# Patient Record
Sex: MALE | Race: WHITE
[De-identification: names, ages, dates, MRNs, and addresses within clinical notes are randomized per-mention and may not be internally consistent; named-entity substitution may affect disease eponyms.]

---

## 2021-06-02 ENCOUNTER — HOSPITAL ENCOUNTER (EMERGENCY)
Dept: HOSPITAL 75 - ER FS | Age: 81
Discharge: ANOTHER HOSPITAL | End: 2021-06-02
Payer: COMMERCIAL

## 2021-06-02 VITALS — HEIGHT: 61.81 IN | WEIGHT: 99.21 LBS | BODY MASS INDEX: 18.26 KG/M2

## 2021-06-02 VITALS — SYSTOLIC BLOOD PRESSURE: 142 MMHG | DIASTOLIC BLOOD PRESSURE: 60 MMHG

## 2021-06-02 DIAGNOSIS — I63.9: Primary | ICD-10-CM

## 2021-06-02 LAB
ALBUMIN SERPL-MCNC: 4.3 GM/DL (ref 3.2–4.5)
ALP SERPL-CCNC: 111 U/L (ref 40–136)
ALT SERPL-CCNC: 11 U/L (ref 0–55)
APTT BLD: 28 SEC (ref 24–35)
APTT PPP: YELLOW S
BACTERIA #/AREA URNS HPF: NEGATIVE /HPF
BASOPHILS # BLD AUTO: 0.1 10^3/UL (ref 0–0.1)
BASOPHILS NFR BLD AUTO: 1 % (ref 0–10)
BILIRUB SERPL-MCNC: 0.9 MG/DL (ref 0.1–1)
BILIRUB UR QL STRIP: NEGATIVE
BUN/CREAT SERPL: 7
CALCIUM SERPL-MCNC: 9.5 MG/DL (ref 8.5–10.1)
CHLORIDE SERPL-SCNC: 101 MMOL/L (ref 98–107)
CO2 SERPL-SCNC: 27 MMOL/L (ref 21–32)
CREAT SERPL-MCNC: 1.2 MG/DL (ref 0.6–1.3)
EOSINOPHIL # BLD AUTO: 0.1 10^3/UL (ref 0–0.3)
EOSINOPHIL NFR BLD AUTO: 1 % (ref 0–10)
FIBRINOGEN PPP-MCNC: CLEAR MG/DL
GFR SERPLBLD BASED ON 1.73 SQ M-ARVRAT: 59 ML/MIN
GLUCOSE SERPL-MCNC: 127 MG/DL (ref 70–105)
GLUCOSE UR STRIP-MCNC: NEGATIVE MG/DL
HCT VFR BLD CALC: 44 % (ref 40–54)
HGB BLD-MCNC: 15 G/DL (ref 13.3–17.7)
INR PPP: 0.9 (ref 0.8–1.4)
KETONES UR QL STRIP: NEGATIVE
LEUKOCYTE ESTERASE UR QL STRIP: NEGATIVE
LYMPHOCYTES # BLD AUTO: 2.3 X 10^3 (ref 1–4)
LYMPHOCYTES NFR BLD AUTO: 29 % (ref 12–44)
MANUAL DIFFERENTIAL PERFORMED BLD QL: NO
MCH RBC QN AUTO: 30 PG (ref 25–34)
MCHC RBC AUTO-ENTMCNC: 34 G/DL (ref 32–36)
MCV RBC AUTO: 90 FL (ref 80–99)
MONOCYTES # BLD AUTO: 0.6 X 10^3 (ref 0–1)
MONOCYTES NFR BLD AUTO: 7 % (ref 0–12)
NEUTROPHILS # BLD AUTO: 4.9 X 10^3 (ref 1.8–7.8)
NEUTROPHILS NFR BLD AUTO: 62 % (ref 42–75)
NITRITE UR QL STRIP: NEGATIVE
PH UR STRIP: 7.5 [PH] (ref 5–9)
PLATELET # BLD: 332 10^3/UL (ref 130–400)
PMV BLD AUTO: 8.9 FL (ref 7.4–10.4)
POTASSIUM SERPL-SCNC: 5 MMOL/L (ref 3.6–5)
PROT SERPL-MCNC: 7.4 GM/DL (ref 6.4–8.2)
PROT UR QL STRIP: NEGATIVE
PROTHROMBIN TIME: 12.9 SEC (ref 12.2–14.7)
RBC #/AREA URNS HPF: (no result) /HPF
SODIUM SERPL-SCNC: 137 MMOL/L (ref 135–145)
SP GR UR STRIP: 1.01 (ref 1.02–1.02)
WBC # BLD AUTO: 7.8 10^3/UL (ref 4.3–11)
WBC #/AREA URNS HPF: (no result) /HPF

## 2021-06-02 PROCEDURE — 80053 COMPREHEN METABOLIC PANEL: CPT

## 2021-06-02 PROCEDURE — 93041 RHYTHM ECG TRACING: CPT

## 2021-06-02 PROCEDURE — 81000 URINALYSIS NONAUTO W/SCOPE: CPT

## 2021-06-02 PROCEDURE — 36415 COLL VENOUS BLD VENIPUNCTURE: CPT

## 2021-06-02 PROCEDURE — 85730 THROMBOPLASTIN TIME PARTIAL: CPT

## 2021-06-02 PROCEDURE — 82947 ASSAY GLUCOSE BLOOD QUANT: CPT

## 2021-06-02 PROCEDURE — 93005 ELECTROCARDIOGRAM TRACING: CPT

## 2021-06-02 PROCEDURE — 71045 X-RAY EXAM CHEST 1 VIEW: CPT

## 2021-06-02 PROCEDURE — 85025 COMPLETE CBC W/AUTO DIFF WBC: CPT

## 2021-06-02 PROCEDURE — 70450 CT HEAD/BRAIN W/O DYE: CPT

## 2021-06-02 PROCEDURE — 85610 PROTHROMBIN TIME: CPT

## 2021-06-02 PROCEDURE — 84484 ASSAY OF TROPONIN QUANT: CPT

## 2021-06-02 NOTE — ED NEUROLOGICAL PROBLEM
General


Stated Complaint:  SLURRED SPEECH; AMS





History of Present Illness


Date Seen by Provider:  Jun 2, 2021


Time Seen by Provider:  09:48


Initial Comments


80-year-old male presents with slurred speech.  Patient reports that he noticed 

around 4:00 this morning when he woke up his speech was slurred.  Patient has 

right-sided facial droop, patient denies any previous stroke.  Patient has no 

extremity symptoms.  He denies any headache, fevers or chills.  He does have 

recent tick bites.  Patient reports he sees a physician at [facility] for his 

heart.  He is on aspirin and metoprolol.  Patient is very poor historian and 

unable to give much other HPI.  Patient lives alone.  No other systemic 

complaints





Allergies and Home Medications


Allergies


Coded Allergies:  


     No Known Drug Allergies (Unverified , 6/2/21)





Patient Home Medication List


Home Medication List Reviewed:  Yes





Review of Systems


Review of Systems


Constitutional:  see HPI; No chills, No fever


Eyes:  No Symptoms Reported


Ears, Nose, Mouth, Throat:  see HPI


Respiratory:  No cough, No short of breath


Cardiovascular:  No chest pain, No palpitations


Gastrointestinal:  No abdominal pain, No nausea, No vomiting


Genitourinary:  no symptoms reported


Musculoskeletal:  no symptoms reported


Psychiatric/Neurological:  No Symptoms Reported


Endocrine:  No Symptoms Reported





Past Medical-Social-Family Hx


Past Med/Social Hx:  Reviewed Nursing Past Med/Soc Hx





Physical Exam


Vital Signs





Vital Signs - First Documented








 6/2/21





 09:54


 


Temp 36.4


 


Pulse 71


 


Resp 18


 


B/P (MAP) 174/88 (116)


 


Pulse Ox 98


 


O2 Delivery Room Air





Capillary Refill :


Height, Weight, BMI


Height: '"


Weight: lbs. oz. kg;  BMI


Method:


General Appearance:  no apparent distress


HEENT:  PERRL/EOMI, pharynx normal


Neck:  full range of motion, supple


Respiratory:  lungs clear, normal breath sounds


Cardiovascular:  normal peripheral pulses, regular rate, rhythm


Gastrointestinal:  non tender, soft


Back:  no CVA tenderness


Extremities:  normal range of motion, non-tender


Neurologic/Psychiatric:  alert, normal mood/affect, oriented x 3


Crainal Nerves:  abnormal speech (Mild slurred), facial droop


Coordination/Gait:  normal finger to nose, normal gait


Motor/Sensory:  no motor deficit; No weak motor strength RUE, No weak motor 

strength LUE, No weak motor strength RLE, No weak motor strength LLE


Skin:  normal color, warm/dry





Stroke


NIH Stroke Scale Assessment





   Level of Consciousness: 0=Alert (0), Level of Consciousness-Questions: 

   0=Answers both month/age (0), LOC Commands: 0=Performs both tasks (0), Gaze: 

   Normal (0), Visual Fields: 0=No visual loss (0), Facial Movement (Facial 

   Paresis): 1=Minor paralysis (1), Motor Function-Arms Right: 0=No drift (0), 

   Motor Function-Arms Left: 0=No drift (0), Motor Function-Legs Right: 0=No 

   drift (0), Motor Function-Legs Left: 0=No drift (0), Limb Ataxia: 0=Absent 

   (0), Sensory: 0=Normal:no loss (0), Best Language: 0=No aphasia (0), 

   Dysarthria: 1=Mild to moderate loss (1), Extinction & Inattention: 0=No 

   abnormality (0), Total: 2





Progress/Results/Core Measures


Results/Orders


Lab Results





Laboratory Tests








Test


 6/2/21


09:54 6/2/21


09:56 6/2/21


10:59 Range/Units


 


 


Glucometer 117 H     MG/DL


 


White Blood Count


 


 7.8 


 


 4.3-11.0


10^3/uL


 


Red Blood Count


 


 4.95 


 


 4.35-5.85


10^6/uL


 


Hemoglobin  15.0   13.3-17.7  G/DL


 


Hematocrit  44   40-54  %


 


Mean Corpuscular Volume  90   80-99  FL


 


Mean Corpuscular Hemoglobin  30   25-34  PG


 


Mean Corpuscular Hemoglobin


Concent 


 34 


 


 32-36  G/DL





 


Red Cell Distribution Width  13.2   10.0-14.5  %


 


Platelet Count


 


 332 


 


 130-400


10^3/uL


 


Mean Platelet Volume  8.9   7.4-10.4  FL


 


Immature Granulocyte % (Auto)  0    %


 


Neutrophils (%) (Auto)  62   42-75  %


 


Lymphocytes (%) (Auto)  29   12-44  %


 


Monocytes (%) (Auto)  7   0-12  %


 


Eosinophils (%) (Auto)  1   0-10  %


 


Basophils (%) (Auto)  1   0-10  %


 


Neutrophils # (Auto)  4.9   1.8-7.8  X 10^3


 


Lymphocytes # (Auto)  2.3   1.0-4.0  X 10^3


 


Monocytes # (Auto)  0.6   0.0-1.0  X 10^3


 


Eosinophils # (Auto)


 


 0.1 


 


 0.0-0.3


10^3/uL


 


Basophils # (Auto)


 


 0.1 


 


 0.0-0.1


10^3/uL


 


Immature Granulocyte # (Auto)


 


 0.0 


 


 0.0-0.1


10^3/uL


 


Prothrombin Time  12.9   12.2-14.7  SEC


 


INR Comment  0.9   0.8-1.4  


 


Activated Partial


Thromboplast Time 


 28 


 


 24-35  SEC





 


Sodium Level  137   135-145  MMOL/L


 


Potassium Level  5.0   3.6-5.0  MMOL/L


 


Chloride Level  101     MMOL/L


 


Carbon Dioxide Level  27   21-32  MMOL/L


 


Anion Gap  9   5-14  MMOL/L


 


Blood Urea Nitrogen  8   7-18  MG/DL


 


Creatinine


 


 1.20 


 


 0.60-1.30


MG/DL


 


Estimat Glomerular Filtration


Rate 


 59 


 


  





 


BUN/Creatinine Ratio  7    


 


Glucose Level  127 H    MG/DL


 


Calcium Level  9.5   8.5-10.1  MG/DL


 


Corrected Calcium  9.3   8.5-10.1  MG/DL


 


Total Bilirubin  0.9   0.1-1.0  MG/DL


 


Aspartate Amino Transf


(AST/SGOT) 


 20 


 


 5-34  U/L





 


Alanine Aminotransferase


(ALT/SGPT) 


 11 


 


 0-55  U/L





 


Alkaline Phosphatase  111     U/L


 


Troponin I  < 0.30   <0.30  NG/ML


 


Total Protein  7.4   6.4-8.2  GM/DL


 


Albumin  4.3   3.2-4.5  GM/DL


 


Urine Color   YELLOW   


 


Urine Clarity   CLEAR   


 


Urine pH   7.5  5-9  


 


Urine Specific Gravity   1.015 L 1.016-1.022  


 


Urine Protein   NEGATIVE  NEGATIVE  


 


Urine Glucose (UA)   NEGATIVE  NEGATIVE  


 


Urine Ketones   NEGATIVE  NEGATIVE  


 


Urine Nitrite   NEGATIVE  NEGATIVE  


 


Urine Bilirubin   NEGATIVE  NEGATIVE  


 


Urine Urobilinogen   NORMAL  < = 1.0  MG/DL


 


Urine Leukocyte Esterase   NEGATIVE  NEGATIVE  


 


Urine RBC (Auto)   1+ H NEGATIVE  


 


Urine RBC   2-5 H  /HPF


 


Urine WBC   NONE   /HPF


 


Urine Crystals   NONE   /LPF


 


Urine Bacteria   NEGATIVE   /HPF


 


Urine Casts   NONE   /LPF


 


Urine Mucus   NEGATIVE   /LPF


 


Urine Culture Indicated   NO   








My Orders





Orders - REYNAGA,CHRISS L DO


Cbc With Automated Diff (6/2/21 09:53)


Protime With Inr (6/2/21 09:53)


Partial Thromboplastin Time (6/2/21 09:53)


Comprehensive Metabolic Panel (6/2/21 09:53)


Troponin I Fs (6/2/21 09:53)


Ua Culture If Indicated (6/2/21 09:53)


Chest 1 View Ap/Pa Only (6/2/21 09:53)


Ekg Tracing (6/2/21 09:53)


Accucheck Stat ONCE (6/2/21 09:53)


Ed Iv/Invasive Line Start (6/2/21 09:53)


Vital Signs Stroke Patient Q15M (6/2/21 09:53)


Ct Head Wo-R/O Stroke (6/2/21 09:53)


Monitor-Rhythm Ecg Trace Only (6/2/21 09:53)


Dysphagia Screening Tool (6/2/21 09:53)


Clopidogrel Tablet (Plavix Tablet) (6/2/21 11:00)





Medications Given in ED





Current Medications








 Medications  Dose


 Ordered  Sig/Lucila


 Route  Start Time


 Stop Time Status Last Admin


Dose Admin


 


 Clopidogrel


 Bisulfate  300 mg  ONCE  ONCE


 PO  6/2/21 11:00


 6/2/21 11:01 DC 6/2/21 10:57


300 MG








Vital Signs/I&O











 6/2/21





 09:54


 


Temp 36.4


 


Pulse 71


 


Resp 18


 


B/P (MAP) 174/88 (116)


 


Pulse Ox 98


 


O2 Delivery Room Air











Progress


Progress Note :  


Progress Note


Patient symptoms consistent with acute CVA.  Patient requested transfer to [facility].  Discussed with accepting hospitalist Dr Zamora and neurologist Dr. Green.  Patient received Plavix 300 mg.  Patient will be transferred to [facility] via EMS for further work-up and evaluation.  Patient is outside the

treatment window for any TPA.





1120


[facility] called back and reports that they have no beds available.  Went and 

discussed with patient about transfer to a stroke center such as .  Patient 

initially declined and states that he was going to go home.  Patient also 

declined transfer to [facility].  Patient then changed his mind and 

agreed for transfer to .  Patient was accepted by Dr. Flores, [facility].  Patient transferred in stable condition


Initial ECG Impression Date:  Jun 2, 2021


Initial ECG Impression Time:  09:52


Initial ECG Rate:  61


Initial ECG Rhythm:  Normal Sinus


Initial ECG Impression:  Nonspecific Changes


Comment


no acute findings





Diagnostic Imaging





   Diagonstic Imaging:  CT


   Plain Films/CT/US/NM/MRI:  head


Comments


CT HEAD WO-R/O STROKE








PROCEDURE: CT head wo r/o stroke.





TECHNIQUE: Multiple contiguous axial images were obtained through


the brain without the use of intravenous contrast. Auto Exposure


Controls were utilized during the CT exam to meet ALARA standards


for radiation dose reduction. 





INDICATION: Left-sided facial droop with dizziness and slurred


speech.





COMPARISON:  No prior studies are available for comparison.





FINDINGS: Ventricles and sulci are prominent consistent with


cerebral volume loss. No sulcal effacement is identified. There


is no midline shift. No acute intra-axial or extra-axial


hemorrhage is detected. Cisterns are patent. Visualized paranasal


sinuses are clear.





IMPRESSION: Cerebral atrophy. No acute intracranial process is


detected.





Departure


Impression





   Primary Impression:  


   Acute CVA (cerebrovascular accident)


Disposition:  02 XFER SHT-TRM HOSP


Condition:  Stable





Transfer


Transfer Reason:  Exceeds level of care


Time Spoke to Accepting Phy:  11:26


Transfer Progress Notes


Patient accepted at [facility], By  dr Flores .  Patient received Plavix 

10 mg prior to transfer.  Patient transferred via EMS in stable condition


Transfer Facility:  


[facility]


Method of Transfer:  EMS











CHRISS REYNAGA DO                Jun 2, 2021 09:48

## 2021-06-02 NOTE — DIAGNOSTIC IMAGING REPORT
PROCEDURE: CT head wo r/o stroke.



TECHNIQUE: Multiple contiguous axial images were obtained through

the brain without the use of intravenous contrast. Auto Exposure

Controls were utilized during the CT exam to meet ALARA standards

for radiation dose reduction. 



INDICATION: Left-sided facial droop with dizziness and slurred

speech.



COMPARISON:  No prior studies are available for comparison.



FINDINGS: Ventricles and sulci are prominent consistent with

cerebral volume loss. No sulcal effacement is identified. There

is no midline shift. No acute intra-axial or extra-axial

hemorrhage is detected. Cisterns are patent. Visualized paranasal

sinuses are clear.



IMPRESSION: Cerebral atrophy. No acute intracranial process is

detected.



Dictated by: 



  Dictated on workstation # [id number]

## 2021-06-02 NOTE — DIAGNOSTIC IMAGING REPORT
INDICATION: Facial droop and dizziness and slurred speech.



TECHNIQUE: Frontal chest obtained at 10:11 a.m.



FINDINGS: Heart and mediastinal silhouette are normal in

appearance. The lungs are clear. There is no pneumothorax or

pleural fluid.



IMPRESSION: Negative chest.



Dictated by: 



  Dictated on workstation # [id number]